# Patient Record
Sex: MALE | Race: WHITE | NOT HISPANIC OR LATINO | URBAN - METROPOLITAN AREA
[De-identification: names, ages, dates, MRNs, and addresses within clinical notes are randomized per-mention and may not be internally consistent; named-entity substitution may affect disease eponyms.]

---
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
## 2022-06-01 ENCOUNTER — APPOINTMENT (OUTPATIENT)
Dept: OTOLARYNGOLOGY | Facility: CLINIC | Age: 22
End: 2022-06-01
Payer: COMMERCIAL

## 2022-06-01 VITALS
HEART RATE: 68 BPM | HEIGHT: 68 IN | SYSTOLIC BLOOD PRESSURE: 128 MMHG | BODY MASS INDEX: 20 KG/M2 | DIASTOLIC BLOOD PRESSURE: 77 MMHG | WEIGHT: 132 LBS

## 2022-06-01 DIAGNOSIS — H93.13 TINNITUS, BILATERAL: ICD-10-CM

## 2022-06-01 DIAGNOSIS — Z78.9 OTHER SPECIFIED HEALTH STATUS: ICD-10-CM

## 2022-06-01 DIAGNOSIS — Z72.89 OTHER PROBLEMS RELATED TO LIFESTYLE: ICD-10-CM

## 2022-06-01 DIAGNOSIS — F17.200 NICOTINE DEPENDENCE, UNSPECIFIED, UNCOMPLICATED: ICD-10-CM

## 2022-06-01 PROCEDURE — 92550 TYMPANOMETRY & REFLEX THRESH: CPT

## 2022-06-01 PROCEDURE — 99203 OFFICE O/P NEW LOW 30 MIN: CPT

## 2022-06-01 PROCEDURE — 92557 COMPREHENSIVE HEARING TEST: CPT

## 2022-06-23 NOTE — HISTORY OF PRESENT ILLNESS
[de-identified] : 22 year old man, initial visit for bilateral hearing loss, Left worse than Right. States symptoms present for the past 2 years.  No injury/trauma to ear or head.  Denies family history of early onset hearing loss.  Last audio 4/07/22.  Reports intermittent tinnitus of both ears.  Prednisone prescribed in the past, no change.  Denies otalgia, otorrhea, dizziness, vertigo, headaches related to ears, recent fevers and ear infections - Hearing loss started 2019 1st right ear then both - no tinnitus or vertigo - no AI hx in family - was given steroids - no help - \par \par s/p CT IACs 4/06/22 showing 1.5cm lobular opacified region posterior Left mastoid, with lack of internal mastoid bony septations

## 2022-07-15 ENCOUNTER — OUTPATIENT (OUTPATIENT)
Dept: OUTPATIENT SERVICES | Facility: HOSPITAL | Age: 22
LOS: 1 days | End: 2022-07-15
Payer: COMMERCIAL

## 2022-07-15 ENCOUNTER — APPOINTMENT (OUTPATIENT)
Dept: MRI IMAGING | Facility: CLINIC | Age: 22
End: 2022-07-15

## 2022-07-15 ENCOUNTER — TRANSCRIPTION ENCOUNTER (OUTPATIENT)
Age: 22
End: 2022-07-15

## 2022-07-15 DIAGNOSIS — H91.93 UNSPECIFIED HEARING LOSS, BILATERAL: ICD-10-CM

## 2022-07-15 PROCEDURE — 70553 MRI BRAIN STEM W/O & W/DYE: CPT

## 2022-07-15 PROCEDURE — A9585: CPT

## 2022-07-15 PROCEDURE — 70553 MRI BRAIN STEM W/O & W/DYE: CPT | Mod: 26

## 2022-07-21 ENCOUNTER — APPOINTMENT (OUTPATIENT)
Dept: PEDIATRIC MEDICAL GENETICS | Facility: CLINIC | Age: 22
End: 2022-07-21

## 2022-07-21 DIAGNOSIS — H91.93 UNSPECIFIED HEARING LOSS, BILATERAL: ICD-10-CM

## 2022-07-21 DIAGNOSIS — H90.5 UNSPECIFIED SENSORINEURAL HEARING LOSS: ICD-10-CM

## 2022-07-26 ENCOUNTER — FORM ENCOUNTER (OUTPATIENT)
Age: 22
End: 2022-07-26

## 2022-08-17 ENCOUNTER — NON-APPOINTMENT (OUTPATIENT)
Age: 22
End: 2022-08-17

## 2022-08-23 ENCOUNTER — APPOINTMENT (OUTPATIENT)
Dept: PHARMACY | Facility: CLINIC | Age: 22
End: 2022-08-23

## 2022-08-24 ENCOUNTER — APPOINTMENT (OUTPATIENT)
Dept: PEDIATRIC MEDICAL GENETICS | Facility: CLINIC | Age: 22
End: 2022-08-24

## 2022-12-07 ENCOUNTER — APPOINTMENT (OUTPATIENT)
Dept: OTOLARYNGOLOGY | Facility: CLINIC | Age: 22
End: 2022-12-07

## 2023-10-14 ENCOUNTER — EMERGENCY (EMERGENCY)
Facility: HOSPITAL | Age: 23
LOS: 1 days | Discharge: HOME | End: 2023-10-14
Attending: EMERGENCY MEDICINE | Admitting: EMERGENCY MEDICINE
Payer: COMMERCIAL

## 2023-10-14 VITALS
RESPIRATION RATE: 16 BRPM | HEART RATE: 77 BPM | DIASTOLIC BLOOD PRESSURE: 67 MMHG | OXYGEN SATURATION: 100 % | TEMPERATURE: 98 F | SYSTOLIC BLOOD PRESSURE: 107 MMHG

## 2023-10-14 VITALS
DIASTOLIC BLOOD PRESSURE: 62 MMHG | SYSTOLIC BLOOD PRESSURE: 106 MMHG | OXYGEN SATURATION: 100 % | HEART RATE: 89 BPM | TEMPERATURE: 98 F | RESPIRATION RATE: 16 BRPM

## 2023-10-14 PROCEDURE — 99284 EMERGENCY DEPT VISIT MOD MDM: CPT

## 2023-10-14 RX ORDER — ONDANSETRON 8 MG/1
4 TABLET, FILM COATED ORAL ONCE
Refills: 0 | Status: COMPLETED | OUTPATIENT
Start: 2023-10-14 | End: 2023-10-14

## 2023-10-14 RX ORDER — ACETAMINOPHEN 500 MG
650 TABLET ORAL ONCE
Refills: 0 | Status: DISCONTINUED | OUTPATIENT
Start: 2023-10-14 | End: 2023-10-14

## 2023-10-14 RX ADMIN — ONDANSETRON 4 MILLIGRAM(S): 8 TABLET, FILM COATED ORAL at 11:48

## 2023-10-14 NOTE — ED ADULT NURSE NOTE - NSFALLUNIVINTERV_ED_ALL_ED
Bed/Stretcher in lowest position, wheels locked, appropriate side rails in place/Call bell, personal items and telephone in reach/Instruct patient to call for assistance before getting out of bed/chair/stretcher/Non-slip footwear applied when patient is off stretcher/[place] to call system/Physically safe environment - no spills, clutter or unnecessary equipment/Purposeful proactive rounding/Room/bathroom lighting operational, light cord in reach

## 2023-10-14 NOTE — ED ADULT NURSE REASSESSMENT NOTE - NS ED NURSE REASSESS COMMENT FT1
pt resting in stretcher at this time. respirations even and unlabored. No acute distress noted. awaiting MTF. safety maintained, side rails up
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
## 2023-10-14 NOTE — ED PROVIDER NOTE - PHYSICAL EXAMINATION
Constitutional: Well nourished, well developed, appears stated age.   Eyes: PERRL, EOMI. injected sclera bilaterally.   HENT: Moist mucous membranes.   Neck: Supple. No goiter.   CV: RRR, no m/r/g. Well perfused extremities.   RESP: Lungs CTAB, normal respiratory effort  GI: Soft, non-tender, no massess appreciated  MSK: Moving all four extremities. No obvious deformity  Skin: Warm, dry, no rashes  Neuro: Alert and oriented. CNII-XII grossly intact. Normal strength and sensation of UEs and LEs. Some lateral nystagmus with EOMI.   Psych: Intoxicated, but following commands, with slightly slurred speech.

## 2023-10-14 NOTE — ED PROVIDER NOTE - PROGRESS NOTE DETAILS
Familia Ruiz PGY2:  Reevaluated Pt by bedside. Updated Pt on labs and imaging. Answered all questions. Reviewed return precautions. Pt expressed understanding and feels comfortable with plan for DC. Pt passed Po challenge. Pt's family is here to take him home.

## 2023-10-14 NOTE — ED PROVIDER NOTE - NS ED MD DISPO DISCHARGE
HepCOVID Clinical Trial     For more information please refer to [facility]: Click on "Tools" then "COVID19 Treatment Protocols and Clinical Trials" Home
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
## 2023-10-14 NOTE — ED PROVIDER NOTE - PATIENT PORTAL LINK FT
You can access the FollowMyHealth Patient Portal offered by [facility] by registering at the following website: [email or web address]. By joining [facility]’s FollowMyHealth portal, you will also be able to view your health information using other applications (apps) compatible with our system.
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
## 2024-10-02 ENCOUNTER — APPOINTMENT (OUTPATIENT)
Dept: PODIATRY | Facility: CLINIC | Age: 24
End: 2024-10-02

## 2024-10-02 DIAGNOSIS — M77.9 ENTHESOPATHY, UNSPECIFIED: ICD-10-CM

## 2024-10-02 DIAGNOSIS — M76.62 ACHILLES TENDINITIS, RIGHT LEG: ICD-10-CM

## 2024-10-02 DIAGNOSIS — M76.61 ACHILLES TENDINITIS, RIGHT LEG: ICD-10-CM

## 2024-10-02 PROCEDURE — 99203 OFFICE O/P NEW LOW 30 MIN: CPT

## 2024-10-02 PROCEDURE — 73610 X-RAY EXAM OF ANKLE: CPT | Mod: LT

## 2024-10-02 RX ORDER — MELOXICAM 15 MG/1
15 TABLET ORAL DAILY
Qty: 14 | Refills: 0 | Status: ACTIVE | COMMUNITY
Start: 2024-10-02 | End: 1900-01-01

## 2024-10-08 NOTE — PHYSICAL EXAM
[2+] : left foot dorsalis pedis 2+ [No Joint Swelling] : no joint swelling [Normal Foot/Ankle] : Both lower extremities were exposed and visualized. Standing exam demonstrates normal foot posture and alignment. Hindfoot exam shows no hindfoot valgus or varus [Skin Color & Pigmentation] : normal skin color and pigmentation [Skin Turgor] : normal skin turgor [Skin Lesions] : no skin lesions [Sensation] : the sensory exam was normal to light touch and pinprick [No Focal Deficits] : no focal deficits [Deep Tendon Reflexes (DTR)] : deep tendon reflexes were 2+ and symmetric [Motor Exam] : the motor exam was normal [General Appearance - Alert] : alert [General Appearance - Well-Appearing] : healthy appearing [Oriented To Time, Place, And Person] : oriented to person, place, and time [Ankle Swelling (On Exam)] : not present [Varicose Veins Of Lower Extremities] : not present [] : not present [Delayed in the Right Toes] : capillary refills normal in right toes [Delayed in the Left Toes] : capillary refills normal in the left toes [Galdino's Test For Radial Artery Patency Bilaterally] : negative bilateral [de-identified] : Pain on palpation to the posterior aspect of the heel at the insertion of the Achilles tendon.  No pain in the watershed area.  Patient has full Achilles tendon integrity.   [Foot Ulcer] : no foot ulcer [Skin Induration] : no skin induration

## 2024-10-08 NOTE — HISTORY OF PRESENT ILLNESS
[FreeTextEntry1] : Patient presents today with his mother with a chief complaint of pain along the posterior aspect of the heel at the Achilles tendon.  Patient is a  and states that he did feel a pop on his left foot, 2 weeks ago.

## 2024-10-08 NOTE — ASSESSMENT
[FreeTextEntry1] : Impression: Insertional Achilles tendonitis, bilateral (M76.61).  Enthesopathy (M77.9).  Treatment: Patient did not want to go into a Cam boot.  He was given heel lifts.  Instructed on eccentric muscle strengthening.  He was given Mobic.  Will attempt this for 2 weeks.  Any questions or problems, patient is to contact the office.

## 2024-10-08 NOTE — PROCEDURE
[FreeTextEntry1] : X-rays were taken. (AP, medial oblique, lateral - bilateral ankles) No evidence of any spurring or of any fracture/dislocation.

## 2024-10-08 NOTE — PHYSICAL EXAM
[2+] : left foot dorsalis pedis 2+ [No Joint Swelling] : no joint swelling [Normal Foot/Ankle] : Both lower extremities were exposed and visualized. Standing exam demonstrates normal foot posture and alignment. Hindfoot exam shows no hindfoot valgus or varus [Skin Color & Pigmentation] : normal skin color and pigmentation [Skin Turgor] : normal skin turgor [Skin Lesions] : no skin lesions [Sensation] : the sensory exam was normal to light touch and pinprick [No Focal Deficits] : no focal deficits [Deep Tendon Reflexes (DTR)] : deep tendon reflexes were 2+ and symmetric [Motor Exam] : the motor exam was normal [General Appearance - Alert] : alert [General Appearance - Well-Appearing] : healthy appearing [Oriented To Time, Place, And Person] : oriented to person, place, and time [Ankle Swelling (On Exam)] : not present [Varicose Veins Of Lower Extremities] : not present [] : not present [Delayed in the Right Toes] : capillary refills normal in right toes [Delayed in the Left Toes] : capillary refills normal in the left toes [Galdino's Test For Radial Artery Patency Bilaterally] : negative bilateral [de-identified] : Pain on palpation to the posterior aspect of the heel at the insertion of the Achilles tendon.  No pain in the watershed area.  Patient has full Achilles tendon integrity.   [Foot Ulcer] : no foot ulcer [Skin Induration] : no skin induration